# Patient Record
Sex: MALE | Race: WHITE | Employment: OTHER | URBAN - METROPOLITAN AREA
[De-identification: names, ages, dates, MRNs, and addresses within clinical notes are randomized per-mention and may not be internally consistent; named-entity substitution may affect disease eponyms.]

---

## 2017-02-09 ENCOUNTER — HOSPITAL ENCOUNTER (OUTPATIENT)
Dept: VASCULAR LAB | Age: 82
Discharge: HOME | End: 2017-02-09
Attending: FAMILY MEDICINE | Admitting: FAMILY MEDICINE

## 2017-02-09 DIAGNOSIS — R09.89 OTHER SPECIFIED SYMPTOMS AND SIGNS INVOLVING THE CIRCULATORY AND RESPIRATORY SYSTEMS: ICD-10-CM

## 2020-11-23 ENCOUNTER — APPOINTMENT (OUTPATIENT)
Dept: GENERAL RADIOLOGY | Age: 85
End: 2020-11-23
Payer: MEDICARE

## 2020-11-23 ENCOUNTER — APPOINTMENT (OUTPATIENT)
Dept: CT IMAGING | Age: 85
End: 2020-11-23
Payer: MEDICARE

## 2020-11-23 ENCOUNTER — HOSPITAL ENCOUNTER (EMERGENCY)
Age: 85
Discharge: HOME | End: 2020-11-23
Attending: EMERGENCY MEDICINE
Payer: MEDICARE

## 2020-11-23 VITALS
WEIGHT: 149 LBS | OXYGEN SATURATION: 96 % | RESPIRATION RATE: 19 BRPM | TEMPERATURE: 97.4 F | HEART RATE: 80 BPM | BODY MASS INDEX: 23.95 KG/M2 | DIASTOLIC BLOOD PRESSURE: 64 MMHG | SYSTOLIC BLOOD PRESSURE: 127 MMHG | HEIGHT: 66 IN

## 2020-11-23 LAB
A/G RATIO: 1.7 (ref 1.1–2.2)
ALBUMIN SERPL-MCNC: 4.5 G/DL (ref 3.4–5)
ALP BLD-CCNC: 58 U/L (ref 40–129)
ALT SERPL-CCNC: 9 U/L (ref 10–40)
ANION GAP SERPL CALCULATED.3IONS-SCNC: 12 MMOL/L (ref 3–16)
ANION GAP SERPL CALCULATED.3IONS-SCNC: 14 MMOL/L (ref 3–16)
AST SERPL-CCNC: 13 U/L (ref 15–37)
BASOPHILS ABSOLUTE: 0 K/UL (ref 0–0.2)
BASOPHILS RELATIVE PERCENT: 0.5 %
BILIRUB SERPL-MCNC: 0.5 MG/DL (ref 0–1)
BILIRUBIN URINE: NEGATIVE
BLOOD, URINE: ABNORMAL
BUN BLDV-MCNC: 18 MG/DL (ref 7–20)
BUN BLDV-MCNC: 23 MG/DL (ref 7–20)
CALCIUM SERPL-MCNC: 9.1 MG/DL (ref 8.3–10.6)
CALCIUM SERPL-MCNC: 9.8 MG/DL (ref 8.3–10.6)
CHLORIDE BLD-SCNC: 103 MMOL/L (ref 99–110)
CHLORIDE BLD-SCNC: 105 MMOL/L (ref 99–110)
CLARITY: CLEAR
CO2: 24 MMOL/L (ref 21–32)
CO2: 24 MMOL/L (ref 21–32)
COLOR: YELLOW
CREAT SERPL-MCNC: 1.1 MG/DL (ref 0.8–1.3)
CREAT SERPL-MCNC: 1.3 MG/DL (ref 0.8–1.3)
EOSINOPHILS ABSOLUTE: 0.1 K/UL (ref 0–0.6)
EOSINOPHILS RELATIVE PERCENT: 2.2 %
EPITHELIAL CELLS, UA: ABNORMAL /HPF (ref 0–5)
GFR AFRICAN AMERICAN: >60
GFR AFRICAN AMERICAN: >60
GFR NON-AFRICAN AMERICAN: 52
GFR NON-AFRICAN AMERICAN: >60
GLOBULIN: 2.7 G/DL
GLUCOSE BLD-MCNC: 122 MG/DL (ref 70–99)
GLUCOSE BLD-MCNC: 170 MG/DL (ref 70–99)
GLUCOSE URINE: 250 MG/DL
HCT VFR BLD CALC: 35.2 % (ref 40.5–52.5)
HEMOGLOBIN: 12.1 G/DL (ref 13.5–17.5)
KETONES, URINE: ABNORMAL MG/DL
LACTIC ACID: 1.2 MMOL/L (ref 0.4–2)
LACTIC ACID: 2.2 MMOL/L (ref 0.4–2)
LEUKOCYTE ESTERASE, URINE: NEGATIVE
LIPASE: 9 U/L (ref 13–60)
LYMPHOCYTES ABSOLUTE: 2.2 K/UL (ref 1–5.1)
LYMPHOCYTES RELATIVE PERCENT: 37.3 %
MAGNESIUM: 1.8 MG/DL (ref 1.8–2.4)
MCH RBC QN AUTO: 28.6 PG (ref 26–34)
MCHC RBC AUTO-ENTMCNC: 34.3 G/DL (ref 31–36)
MCV RBC AUTO: 83.4 FL (ref 80–100)
MICROSCOPIC EXAMINATION: YES
MONOCYTES ABSOLUTE: 0.5 K/UL (ref 0–1.3)
MONOCYTES RELATIVE PERCENT: 8.2 %
NEUTROPHILS ABSOLUTE: 3 K/UL (ref 1.7–7.7)
NEUTROPHILS RELATIVE PERCENT: 51.8 %
NITRITE, URINE: NEGATIVE
PDW BLD-RTO: 14.1 % (ref 12.4–15.4)
PH UA: 7 (ref 5–8)
PLATELET # BLD: 184 K/UL (ref 135–450)
PMV BLD AUTO: 8 FL (ref 5–10.5)
POTASSIUM SERPL-SCNC: 3 MMOL/L (ref 3.5–5.1)
POTASSIUM SERPL-SCNC: 3.8 MMOL/L (ref 3.5–5.1)
PROTEIN UA: ABNORMAL MG/DL
RBC # BLD: 4.23 M/UL (ref 4.2–5.9)
RBC UA: ABNORMAL /HPF (ref 0–4)
RENAL EPITHELIAL, UA: ABNORMAL /HPF (ref 0–1)
SODIUM BLD-SCNC: 141 MMOL/L (ref 136–145)
SODIUM BLD-SCNC: 141 MMOL/L (ref 136–145)
SPECIFIC GRAVITY UA: 1.02 (ref 1–1.03)
TOTAL PROTEIN: 7.2 G/DL (ref 6.4–8.2)
TROPONIN: <0.01 NG/ML
URINE REFLEX TO CULTURE: YES
URINE TYPE: ABNORMAL
UROBILINOGEN, URINE: 0.2 E.U./DL
WBC # BLD: 5.8 K/UL (ref 4–11)
WBC UA: ABNORMAL /HPF (ref 0–5)

## 2020-11-23 PROCEDURE — 96375 TX/PRO/DX INJ NEW DRUG ADDON: CPT

## 2020-11-23 PROCEDURE — 87086 URINE CULTURE/COLONY COUNT: CPT

## 2020-11-23 PROCEDURE — 6370000000 HC RX 637 (ALT 250 FOR IP): Performed by: EMERGENCY MEDICINE

## 2020-11-23 PROCEDURE — 71045 X-RAY EXAM CHEST 1 VIEW: CPT

## 2020-11-23 PROCEDURE — 74177 CT ABD & PELVIS W/CONTRAST: CPT

## 2020-11-23 PROCEDURE — 2580000003 HC RX 258: Performed by: NURSE PRACTITIONER

## 2020-11-23 PROCEDURE — 84484 ASSAY OF TROPONIN QUANT: CPT

## 2020-11-23 PROCEDURE — 83690 ASSAY OF LIPASE: CPT

## 2020-11-23 PROCEDURE — 96361 HYDRATE IV INFUSION ADD-ON: CPT

## 2020-11-23 PROCEDURE — 93005 ELECTROCARDIOGRAM TRACING: CPT | Performed by: NURSE PRACTITIONER

## 2020-11-23 PROCEDURE — 85025 COMPLETE CBC W/AUTO DIFF WBC: CPT

## 2020-11-23 PROCEDURE — 96374 THER/PROPH/DIAG INJ IV PUSH: CPT

## 2020-11-23 PROCEDURE — 6360000004 HC RX CONTRAST MEDICATION: Performed by: NURSE PRACTITIONER

## 2020-11-23 PROCEDURE — 83735 ASSAY OF MAGNESIUM: CPT

## 2020-11-23 PROCEDURE — 6360000002 HC RX W HCPCS: Performed by: NURSE PRACTITIONER

## 2020-11-23 PROCEDURE — 81001 URINALYSIS AUTO W/SCOPE: CPT

## 2020-11-23 PROCEDURE — 74018 RADEX ABDOMEN 1 VIEW: CPT

## 2020-11-23 PROCEDURE — 99284 EMERGENCY DEPT VISIT MOD MDM: CPT

## 2020-11-23 PROCEDURE — 83605 ASSAY OF LACTIC ACID: CPT

## 2020-11-23 PROCEDURE — 80053 COMPREHEN METABOLIC PANEL: CPT

## 2020-11-23 RX ORDER — ONDANSETRON 4 MG/1
4 TABLET, FILM COATED ORAL EVERY 8 HOURS PRN
Qty: 20 TABLET | Refills: 0 | Status: SHIPPED | OUTPATIENT
Start: 2020-11-23

## 2020-11-23 RX ORDER — POTASSIUM CHLORIDE 20 MEQ/1
40 TABLET, EXTENDED RELEASE ORAL ONCE
Status: DISCONTINUED | OUTPATIENT
Start: 2020-11-23 | End: 2020-11-24 | Stop reason: HOSPADM

## 2020-11-23 RX ORDER — PROMETHAZINE HYDROCHLORIDE 25 MG/ML
12.5 INJECTION, SOLUTION INTRAMUSCULAR; INTRAVENOUS EVERY 30 MIN PRN
Status: DISCONTINUED | OUTPATIENT
Start: 2020-11-23 | End: 2020-11-24 | Stop reason: HOSPADM

## 2020-11-23 RX ORDER — DOXYCYCLINE HYCLATE 100 MG
100 TABLET ORAL ONCE
Status: COMPLETED | OUTPATIENT
Start: 2020-11-23 | End: 2020-11-23

## 2020-11-23 RX ORDER — 0.9 % SODIUM CHLORIDE 0.9 %
500 INTRAVENOUS SOLUTION INTRAVENOUS ONCE
Status: COMPLETED | OUTPATIENT
Start: 2020-11-23 | End: 2020-11-23

## 2020-11-23 RX ORDER — 0.9 % SODIUM CHLORIDE 0.9 %
1000 INTRAVENOUS SOLUTION INTRAVENOUS ONCE
Status: COMPLETED | OUTPATIENT
Start: 2020-11-23 | End: 2020-11-23

## 2020-11-23 RX ORDER — POTASSIUM CHLORIDE 7.45 MG/ML
10 INJECTION INTRAVENOUS ONCE
Status: COMPLETED | OUTPATIENT
Start: 2020-11-23 | End: 2020-11-23

## 2020-11-23 RX ORDER — ONDANSETRON 2 MG/ML
4 INJECTION INTRAMUSCULAR; INTRAVENOUS ONCE
Status: COMPLETED | OUTPATIENT
Start: 2020-11-23 | End: 2020-11-23

## 2020-11-23 RX ORDER — DOXYCYCLINE HYCLATE 100 MG
100 TABLET ORAL 2 TIMES DAILY
Qty: 20 TABLET | Refills: 0 | Status: SHIPPED | OUTPATIENT
Start: 2020-11-23 | End: 2020-12-03

## 2020-11-23 RX ADMIN — POTASSIUM CHLORIDE 10 MEQ: 10 INJECTION, SOLUTION INTRAVENOUS at 19:13

## 2020-11-23 RX ADMIN — SODIUM CHLORIDE 1000 ML: 9 INJECTION, SOLUTION INTRAVENOUS at 18:33

## 2020-11-23 RX ADMIN — SODIUM CHLORIDE 500 ML: 9 INJECTION, SOLUTION INTRAVENOUS at 17:42

## 2020-11-23 RX ADMIN — DOXYCYCLINE HYCLATE 100 MG: 100 TABLET, COATED ORAL at 23:19

## 2020-11-23 RX ADMIN — IOPAMIDOL 75 ML: 755 INJECTION, SOLUTION INTRAVENOUS at 21:34

## 2020-11-23 RX ADMIN — ONDANSETRON 4 MG: 2 INJECTION INTRAMUSCULAR; INTRAVENOUS at 17:38

## 2020-11-23 RX ADMIN — PROMETHAZINE HYDROCHLORIDE 12.5 MG: 25 INJECTION INTRAMUSCULAR; INTRAVENOUS at 18:32

## 2020-11-23 RX ADMIN — SODIUM CHLORIDE 500 ML: 9 INJECTION, SOLUTION INTRAVENOUS at 19:12

## 2020-11-23 NOTE — ED PROVIDER NOTES
[facility] Emergency Department    CHIEF COMPLAINT  Emesis (after eating chili and mints. pt given zofran 4 mg po per ems but pt vomited medication up) and Dizziness      SHARED SERVICE VISIT  I have seen and evaluated this patient with my supervising physician, Dr. Adrienne Good. HISTORY OF PRESENT ILLNESS  Gigi De La Torre is a 80 y.o. male who presents to the ED complaining of\" eating [employer] chili and peppermint and and with nausea and nonbilious vomiting accompanied by \"aching\" 3/10 nonradiating diffuse abdominal pain, dizziness and presyncope. Denies chest pain, shortness of breath, cough, fever, chills, sweats, diarrhea, or other complaints. Patient states that he drinks approximately 3 glasses of water a day. Patient's daughter is at bedside. Patient utilizing urinal at home with daughter standing outside the room, as I was exiting after physical examination patient's daughter states that Juanita Au will tell you he ate something that caused the problem but he just never wants to come to the hospital\". No other complaints, modifying factors or associated symptoms. Nursing notes reviewed.    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, without mention of obstruction 03/22/14    Enlarged prostate     Gastric ulcer 1985    Hearing loss of both ears     Hypertension     Unspecified cerebral artery occlusion with cerebral infarction 2011    sl droop of rt side of mouth, affected taste    Wears glasses      Past Surgical History:   Procedure Laterality Date    CATARACT REMOVAL WITH IMPLANT      bilat    CYSTOSCOPY  4-    Cystoscopy, transurethral vaporization of prostate with olympus plasma button    EYE SURGERY      cataract    INGUINAL HERNIA REPAIR  9-    Open right inguinal hernia repair with mesh patch, biopsy of skin lesion left fourth finger    SKIN CANCER EXCISION  11/1/11    LEFT HAND WITH GRAFT FROM LEFT FOREARM    UPPER GASTROINTESTINAL ENDOSCOPY  1985    UPPER GASTROINTESTINAL ENDOSCOPY  3/22/14    duodenal ulcer cauterized     History reviewed. No pertinent family history.   Social History     Socioeconomic History    Marital status:      Spouse name: Not on file    Number of children: Not on file    Years of education: Not on file    Highest education level: Not on file   Occupational History    Not on file   Social Needs    Financial resource strain: Not on file    Food insecurity     Worry: Not on file     Inability: Not on file    Transportation needs     Medical: Not on file     Non-medical: Not on file   Tobacco Use    Smoking status: Former Smoker    Smokeless tobacco: Never Used    Tobacco comment: smoked cigars many years ago   Substance and Sexual Activity    Alcohol use: No    Drug use: No    Sexual activity: Not on file   Lifestyle    Physical activity     Days per week: Not on file     Minutes per session: Not on file    Stress: Not on file   Relationships    Social connections     Talks on phone: Not on file     Gets together: Not on file     Attends Sikh service: Not on file     Active member of club or organization: Not on file     Attends meetings of clubs or organizations: Not on file     Relationship status: Not on file    Intimate partner violence     Fear of current or ex partner: Not on file     Emotionally abused: Not on file     Physically abused: Not on file     Forced sexual activity: Not on file   Other Topics Concern    Not on file   Social History Narrative    Not on file     Current Facility-Administered Medications   Medication Dose Route Frequency Provider Last Rate Last Dose    promethazine (PHENERGAN) injection 12.5 mg  12.5 mg Intravenous Q30 Min PRN Mallorie Never, APRN - CNP   12.5 mg at 11/23/20 1832    potassium chloride (KLOR-CON M) extended release tablet 40 mEq  40 mEq Oral Once Keyon OsborneCYNTHIA CNP        0.9 % sodium chloride bolus  1,000 mL Intravenous Once Keyon OsborneCYNTHIA CNP 1,000 mL/hr at 11/23/20 1833 1,000 mL at 11/23/20 1833     Current Outpatient Medications   Medication Sig Dispense Refill    omeprazole (PRILOSEC) 40 MG capsule Take 1 capsule by mouth daily. 30 capsule 0    lovastatin (MEVACOR) 40 MG tablet Take 40 mg by mouth nightly.  tamsulosin (FLOMAX) 0.4 MG capsule Take 0.4 mg by mouth daily.  lisinopril (PRINIVIL;ZESTRIL) 40 MG tablet Take 40 mg by mouth daily. No Known Allergies    REVIEW OF SYSTEMS  10 systems reviewed, pertinent positives per HPI otherwise noted to be negative    PHYSICAL EXAM  BP (!) 137/59   Pulse 90   Temp 97.4 °F (36.3 °C) (Oral)   Resp 20   Ht 5' 6\" (1.676 m)   Wt 149 lb (67.6 kg)   SpO2 96%   BMI 24.05 kg/m²   GENERAL APPEARANCE: Awake and alert. Cooperative.  + Distress. Non-- toxic in appearance. HEAD: Normocephalic. Atraumatic. EYES: PERRL. EOM's grossly intact. ENT: Mucous membranes are dry. NECK: Supple. No JVD  HEART: RRR. No murmurs.  + S1-S2.  LUNGS: Respirations unlabored. CTAB. Good air exchange. Speaking comfortably in full sentences. ABDOMEN: Soft. Non-distended. Non-tender. No guarding or rebound. No masses. No organomegaly. EXTREMITIES: No peripheral edema. Moves all extremities equally. All extremities neurovascularly intact. SKIN: Warm and dry. No acute rashes. NEUROLOGICAL: Alert and oriented. CN's 2-12 intact. No gross facial drooping. Strength 5/5, sensation intact. PSYCHIATRIC: Normal mood and affect. RADIOLOGY  Xr Chest Portable    Result Date: 11/23/2020  EXAMINATION: ONE XRAY VIEW OF THE CHEST 11/23/2020 5:46 pm COMPARISON: 07/25/2016. HISTORY: ORDERING SYSTEM PROVIDED HISTORY: other TECHNOLOGIST PROVIDED HISTORY: Reason for exam:->other Reason for Exam: other FINDINGS: The cardiomediastinal silhouette is unremarkable. The lungs are clear.   No infiltrate, pleural fluid or evidence of overt failure. Stable granuloma in the right upper lobe. No acute cardiopulmonary disease. ED COURSE  Patient did not require analgesia while in the emergency department. Triage vitals stable but with systolic hypertension 584/02. Labs Ordered:  CMP: Potassium 3.0, glucose 170, BUN 23, GFR 52, ALT 9, AST 13; otherwise unremarkable  Lipase: 9  Troponin: <0.01  CBC: Negative for leukocytosis with mild anemia hemoglobin 12.1, hematocrit 35.2; otherwise unremarkable    EKG: As interpreted by EMD.  Normal sinus rhythm. Normal ECG when compared with ECG of 25-July-2016, QT has lengthened. Imaging ordered:  CXR: No acute cardiopulmonary disease. XR abdomen (KUB): Nonspecific abdominal bowel gas pattern. CT abdomen pelvis: Mild bilateral renal pelviectasis and hydroureter to the level of the bladder. There is prostatomegaly with indentation of the posterior bladder. Question mild soft tissue nodularity in the post bladider. Urachal remnant with minimal soft tissue prominence within the remnant. Given the above findings, suggest urology evaluation. Diverticulosis without evidence of diverticulitis. Mild cardiomegaly with coronary artery disease. Interventions:  Patient was given 2 L boluses for hydration and Zofran for nausea. 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 Reevaluation:  On reevaluation patient is reportedly feeling much better. He is not actively vomiting. He is resting comfortably on stretcher with vital signs stable. Shift report given to [facility] who will receive CT abdomen pelvis results and appropriately disposition patient to see her note for details. CRITICAL CARE TIME  0 Minutes of critical care time spent not including separately billable procedures. MDM  See note from [address] [facility].     Clinical Impression:  Hypokalemia  Intractable vomiting with nausea  Prostatomegaly DISPOSITION  Discharge      This chart was created using Dragon dictation. Every effort was made by myself to ensure accuracy, however due to limitations of this technology errors may be present.         Catina Rojas, CYNTHIA - LORRI  11/24/20 1589

## 2020-11-24 LAB
EKG ATRIAL RATE: 79 BPM
EKG DIAGNOSIS: NORMAL
EKG P AXIS: 50 DEGREES
EKG P-R INTERVAL: 184 MS
EKG Q-T INTERVAL: 404 MS
EKG QRS DURATION: 100 MS
EKG QTC CALCULATION (BAZETT): 463 MS
EKG R AXIS: 25 DEGREES
EKG T AXIS: 22 DEGREES
EKG VENTRICULAR RATE: 79 BPM
ORGANISM: ABNORMAL
URINE CULTURE, ROUTINE: ABNORMAL

## 2020-11-24 PROCEDURE — 93010 ELECTROCARDIOGRAM REPORT: CPT | Performed by: INTERNAL MEDICINE

## 2020-11-24 NOTE — ED NOTES
Findings and plan of care discussed at bedside by provider. Pt verbalized understanding of plan of care, pt discharged with all instructions reviewed and any prescriptions as applicable. All belongings in hand including discharge instructions, prescriptions x2, and personal belongings. Pt in no acute distress.      Enrique May RN  11/23/20 [id number]

## 2020-11-24 NOTE — ED PROVIDER NOTES
I independently performed a history and physical on José Cunningham. All diagnostic, treatment, and disposition decisions were made by myself in conjunction with the advanced practice provider.     -José Cunningham is a 80 y.o. male presents to ED for emesis. Patient states that he had Leslie's chili as well as some peppermints and soon after had several episodes of emesis. Upon arrival to ED patient was dry heaving was given antinausea medication. On my assessment, patient states that he is still slightly nauseous however has not any further episodes of emesis, denies abdominal pain, fever, diarrhea. -PE: well appearing, nontoxic, not in acute distress. RRR, CTAB/l, no w/r/r, abdomen soft, ND, NTTP, + BS x 4, no rigidity, rebound, guarding  -lab workup significant for: Hypokalemia of 3.0, elevated BUN of 23, lactic acidosis of 2.2 which is likely from emetic episodes. Negative leukocytosis, H&H stable at 12.1/35.2.  -X-ray shows no acute cardiopulmonary disease. -KUB shows nonspecific abnormal bowel gas pattern  -Ua: Negative nitrite, leukocyte esterase, 10-20 WBC, 3-4 RBC and no epithelial cells  -Given elevated lactic, CT abdomen pelvis was done which showed mild bilateral extrarenal pelvic stasis and hydroureter to the level of the bladder. There is pus did go megaly with indentation of the posterior bladder. Question mild soft tissue nodularity in the posterior bladder. Urachal remnant with minimal soft tissue prominence within the remnant. Diverticulosis without evidence of acute diverticulitis. Mild cardiomegaly with CAD. -Discussed results with patient, he states that he is feeling much improved, no further episodes of emesis or nausea. He states he has been having burning sensation upon urination. He does not follow-up with a urologist, was told that his prostate was enlarged at one point had difficulty urinating. However he has no difficulty urinating now.   -Sidney lactic improved to 1.5, repeat BMP after potassium repletion showed resolution of hyperkalemia.  -The Ekg interpreted by me shows  normal sinus rhythm with a rate of 79  Axis is   Normal  QTc is  463  Intervals and Durations are unremarkable. ST Segments: no acute change  No significant change from prior EKG dated 7/25/2016  -Patient was p.o. challenged, he did well and had no further episodes of emesis in ED. He states he is feeling improved has no further symptoms now. I did discuss plan. As patient improved, stable vitals I do feel that he is safe for discharge at this time. I did discuss at length the findings on the CT scan and the importance of following up with a urology group, referral will be given. We will also discharge with Zofran for further episodes of nausea as well as doxycycline, first dose given here in ED for UTI. Strict ED return precautions given for new/worsending symptoms. Patient and family in agreement with plan, verbally confirm understanding and have no further questions/concerns. For further details of [facility] emergency department encounter, please see SUSIE Woodard's documentation.        Tamika Medina MD  11/23/20 [id number]

## 2020-11-24 NOTE — ED NOTES
Updated patients daughter on test results with patient permission. Reports that she is on her way to come get patient.  Patient will be sitting in ED discharge room awaiting daughters arrival.      Desiree Pace RN  11/23/20 [id number]

## 2024-11-11 ENCOUNTER — INITIAL CONSULT (OUTPATIENT)
Dept: SURGERY | Age: 89
End: 2024-11-11

## 2024-11-11 VITALS
HEIGHT: 64 IN | DIASTOLIC BLOOD PRESSURE: 68 MMHG | SYSTOLIC BLOOD PRESSURE: 132 MMHG | BODY MASS INDEX: 23.15 KG/M2 | WEIGHT: 135.6 LBS

## 2024-11-11 DIAGNOSIS — L98.9 ARM SKIN LESION, RIGHT: Primary | ICD-10-CM

## 2024-11-11 RX ORDER — AMLODIPINE BESYLATE 10 MG/1
10 TABLET ORAL DAILY
COMMUNITY

## 2024-11-11 RX ORDER — SULFAMETHOXAZOLE AND TRIMETHOPRIM 800; 160 MG/1; MG/1
1 TABLET ORAL 2 TIMES DAILY
COMMUNITY

## 2024-11-11 RX ORDER — TRAMADOL HYDROCHLORIDE 50 MG/1
50 TABLET ORAL EVERY 6 HOURS PRN
COMMUNITY

## 2024-11-11 RX ORDER — CELECOXIB 100 MG/1
CAPSULE ORAL
COMMUNITY
Start: 2024-10-21

## 2024-11-11 RX ORDER — LEVOTHYROXINE SODIUM 25 UG/1
TABLET ORAL
COMMUNITY
Start: 2024-10-21

## 2024-11-12 NOTE — PROGRESS NOTES
for input(s): \"WBC\", \"HGB\", \"HCT\", \"PLT\" in the last 72 hours.  BMP:  No results for input(s): \"NA\", \"K\", \"CL\", \"CO2\", \"BUN\", \"CREATININE\", \"GLUCOSE\" in the last 72 hours.  Hepatic: No results for input(s): \"AST\", \"ALT\", \"BILITOT\", \"ALKPHOS\" in the last 72 hours.    Invalid input(s): \"ALB\"  Mag:    No results for input(s): \"MG\" in the last 72 hours.   Phos:   No results for input(s): \"PHOS\" in the last 72 hours.   INR: No results for input(s): \"INR\" in the last 72 hours.    Radiology Review: Images personally reviewed by me.   NA      IMPRESSION/RECOMMENDATIONS:    89 yo with right forearm lesion  Concern for cancer given appearance.  Follow up on biopsy results done at PCP office.  Discussed with patient and family options of excision vs watching regardless of pathology.  Could likely do under local anesthetic but still not without risks given age and healing potential.  Will await pathology before making final decision.    Electronically signed by Everette Che MD     [facility]  [id number]